# Patient Record
Sex: FEMALE | Race: WHITE | NOT HISPANIC OR LATINO | Employment: FULL TIME | URBAN - METROPOLITAN AREA
[De-identification: names, ages, dates, MRNs, and addresses within clinical notes are randomized per-mention and may not be internally consistent; named-entity substitution may affect disease eponyms.]

---

## 2017-02-27 ENCOUNTER — TRANSCRIBE ORDERS (OUTPATIENT)
Dept: URGENT CARE | Facility: CLINIC | Age: 42
End: 2017-02-27

## 2017-02-27 DIAGNOSIS — M54.16 RADICULOPATHY, LUMBAR REGION: ICD-10-CM

## 2017-02-27 DIAGNOSIS — S33.6XXA SACROILIAC (LIGAMENT) SPRAIN, INITIAL ENCOUNTER: Primary | ICD-10-CM

## 2017-02-28 ENCOUNTER — TREATMENT (OUTPATIENT)
Dept: PHYSICAL THERAPY | Facility: CLINIC | Age: 42
End: 2017-02-28

## 2017-02-28 DIAGNOSIS — M54.50 LEFT-SIDED LOW BACK PAIN WITHOUT SCIATICA, UNSPECIFIED CHRONICITY: Primary | ICD-10-CM

## 2017-02-28 PROCEDURE — 97001 PR PHYS THERAPY EVALUATION: CPT | Performed by: PHYSICAL THERAPIST

## 2017-02-28 PROCEDURE — 97110 THERAPEUTIC EXERCISES: CPT | Performed by: PHYSICAL THERAPIST

## 2017-02-28 PROCEDURE — 97035 APP MDLTY 1+ULTRASOUND EA 15: CPT | Performed by: PHYSICAL THERAPIST

## 2017-02-28 NOTE — PROGRESS NOTES
Physical Therapy Initial Evaluation and Plan of Care        Subjective Evaluation    History of Present Illness  Date of onset: 2017  Mechanism of injury: Squatting at work at [employer].  Job title   Job requirements occasional lifting up to 25#    Presently working restrictive duty    Quality of life: good    Pain  Current pain ratin  At best pain ratin  At worst pain rating: 3  Location: low back  Quality: dull ache  Relieving factors: rest (sitting)  Exacerbated by: prolonged standing.  Progression: improved    Social Support  Lives in: one-story house  Lives with: spouse    Diagnostic Tests  No diagnostic tests performed    Treatments  Previous treatment: chiropractic (5 years ago)  Patient Goals  Patient goal: prevent re-injury     Short term goals:  1 to 2 weeks  Patient will demonstrate functional lumbar range of motion all planes without pain  Patient will report less than 1pain with functional activities  Patient will be independent in HEP  Patient will demonstrate improved core stabilization strength        Long term goals 4  weeks  Patient will demonstrate improved body mechanics with squatting floor to waist lifting up to 25#   Patient will report minimal to no back pain with performing job demand activities             Objective     Palpation   Left   Tenderness of the lumbar paraspinals.     Right Tenderness of the lumbar paraspinals.     Additional Palpation Details  Trigger points in bilateral piriformis    Active Range of Motion     Lumbar   Flexion: Active lumbar flexion: tightness at end range. WFL  Extension: Active lumbar extension: tightness at endrange. WFL  Left lateral flexion: WFL  Right lateral flexion: WFL  Left rotation: Active left lumbar rotation: 75% of normall range with pain in left piriformis.   Right rotation: WFL    Strength/Myotome Testing     Left Hip   Planes of Motion   Flexion: 4    Right Hip   Planes of Motion   Flexion: 4    Left Knee   Extension:  4+    Right Knee   Extension: 4+    Left Ankle/Foot   Dorsiflexion: 5  Plantar flexion: 5    Right Ankle/Foot   Dorsiflexion: 5  Plantar flexion: 5    Additional Strength Details  Core stabilization weakness present    Tightness in left HS, bilateral piriformis and bilateral hip adductors     Tests     Lumbar     Left   Negative passive SLR.     Right   Negative passive SLR.     Left Pelvic Girdle/Sacrum   Negative: sacrum compression.     Right Pelvic Girdle/Sacrum   Negative: sacrum compression.     Additional Tests Details  Prone press up negative    Ambulation     Observational Gait     Additional Observational Gait Details  Prolonged weightshifting thru LLE in stance phase of gait         Assessment & Plan     Assessment  Impairments: abnormal gait, abnormal or restricted ROM, impaired physical strength, lacks appropriate home exercise program and pain with function  Assessment details: 41 year old female with diagnosis of low back pain which occurred at work after squatting.  She presents with left greater than right low back pain, stiffness and core weakness which is limiting her from performing her regular duty job responsibilities at [employer] as a   Barriers to therapy: none  Prognosis: good    Goals  prevent re-injury     Short term goals:  1 to 2 weeks  Patient will demonstrate functional lumbar range of motion all planes without pain  Patient will report less than 1pain with functional activities  Patient will be independent in HEP  Patient will demonstrate improved core stabilization strength        Long term goals 4  weeks  Patient will demonstrate improved body mechanics with squatting floor to waist lifting up to 25#   Patient will report minimal to no back pain with performing job demand activities      Plan  Therapy options: will be seen for skilled physical therapy services  Planned modality interventions: cryotherapy, thermotherapy (hydrocollator packs), high voltage pulsed current  (pain management) and ultrasound  Planned therapy interventions: abdominal trunk stabilization, body mechanics training, flexibility, functional ROM exercises, home exercise program, manual therapy, soft tissue mobilization, spinal/joint mobilization, strengthening, stretching and therapeutic activities  Frequency: 3x week  Duration in weeks: 4  Treatment plan discussed with: patient        Manual Therapy:    0     mins  09384;  Therapeutic Exercise:    20     mins  60845;     Neuromuscular Oralia:    0    mins  85354;    Therapeutic Activity:     0     mins  33848;     Gait Trainin     mins  27416;     Ultrasound:     9     mins  54909;    Work Hardening           0      mins 99434  Iontophoresis               0   mins 27919    Timed Treatment:   29   mins   Total Treatment:     65   mins    PT SIGNATURE: Kami Shannon, PT   DATE TREATMENT INITIATED: 2017    Initial Certification  Certification Period: 2017  I certify that the therapy services are furnished while this patient is under my care.  The services outlined above are required by this patient, and will be reviewed every 90 days.     PHYSICIAN: Roseline Story, LEXX      DATE:     Please sign and return via fax to [phone].. Thank you, [facility].

## 2017-03-07 ENCOUNTER — TREATMENT (OUTPATIENT)
Dept: PHYSICAL THERAPY | Facility: CLINIC | Age: 42
End: 2017-03-07

## 2017-03-07 DIAGNOSIS — M54.50 LEFT-SIDED LOW BACK PAIN WITHOUT SCIATICA, UNSPECIFIED CHRONICITY: Primary | ICD-10-CM

## 2017-03-07 PROCEDURE — 97530 THERAPEUTIC ACTIVITIES: CPT | Performed by: PHYSICAL THERAPIST

## 2017-03-07 PROCEDURE — 97110 THERAPEUTIC EXERCISES: CPT | Performed by: PHYSICAL THERAPIST

## 2017-03-07 NOTE — PROGRESS NOTES
Physical Therapy Progress Note  3/7/2017  Roseline Story, LEXX    Re: Lucy Story  ________________________________________________________________      Ms. Lucy Story, has attended 2/3 PT sessions.  Treatment has consisted of: therapeutic exercises and HEP     S: Ms. Lucy Story states: soreness after first visit of PT and doing exercises      Subjective Evaluation    Pain  Current pain ratin  At best pain ratin  At worst pain ratin  Location: low back           Objective     Palpation     Additional Palpation Details  No palpable tenderness in lumbar spinal structures or muscles    Active Range of Motion     Lumbar   Flexion: WFL  Extension: WFL  Left lateral flexion: WFL  Right lateral flexion: WFL  Left rotation: WFL  Right rotation: WFL    Strength/Myotome Testing     Additional Strength Details  Core stabilizers in good range     See Exercise, Manual, and Modality Logs for complete treatment.       Assessment & Plan     Assessment  Assessment details: Patient's lumbar range of motion is WNL all planes.  She is reporting no low back pain with daily activities.  She demonstrates good core stabilization strength with proper body mechanics with floor to waist lifting to 25# in which she reports is her job requirements. She has a good understanding of her present HEP      Recommend DC to HEP             Manual Therapy:    0     mins  82552;  Therapeutic Exercise:    15     mins  82047;     Neuromuscular Oralia:    0    mins  61407;    Therapeutic Activity:     15     mins  05166;     Gait Trainin     mins  49628;     Ultrasound:     0     mins  34424;    Work Hardening           0      mins 71120  Iontophoresis               0   mins 41424    Timed Treatment:   30   mins   Total Treatment:     32   mins    Kami Shannon PT  Physical Therapist

## 2017-03-31 ENCOUNTER — DOCUMENTATION (OUTPATIENT)
Dept: PHYSICAL THERAPY | Facility: CLINIC | Age: 42
End: 2017-03-31

## 2019-07-15 ENCOUNTER — HOSPITAL ENCOUNTER (OUTPATIENT)
Facility: HOSPITAL | Age: 44
Setting detail: OBSERVATION
Discharge: HOME | End: 2019-07-16
Attending: EMERGENCY MEDICINE | Admitting: SURGERY

## 2019-07-15 ENCOUNTER — APPOINTMENT (OUTPATIENT)
Dept: CT IMAGING | Facility: HOSPITAL | Age: 44
End: 2019-07-15

## 2019-07-15 DIAGNOSIS — K57.92 DIVERTICULITIS: Primary | ICD-10-CM

## 2019-07-15 LAB
ALBUMIN SERPL-MCNC: 4.1 G/DL (ref 3.5–5.2)
ALBUMIN/GLOB SERPL: 1.3 G/DL
ALP SERPL-CCNC: 53 U/L (ref 39–117)
ALT SERPL W P-5'-P-CCNC: 11 U/L (ref 1–33)
ANION GAP SERPL CALCULATED.3IONS-SCNC: 12.9 MMOL/L (ref 5–15)
AST SERPL-CCNC: 12 U/L (ref 1–32)
BACTERIA UR QL AUTO: NORMAL /HPF
BASOPHILS # BLD AUTO: 0.01 10*3/MM3 (ref 0–0.2)
BASOPHILS NFR BLD AUTO: 0.1 % (ref 0–1.5)
BILIRUB SERPL-MCNC: 1.1 MG/DL (ref 0.2–1.2)
BILIRUB UR QL STRIP: NEGATIVE
BUN BLD-MCNC: 7 MG/DL (ref 6–20)
BUN/CREAT SERPL: 10.8 (ref 7–25)
CALCIUM SPEC-SCNC: 9.1 MG/DL (ref 8.6–10.5)
CHLORIDE SERPL-SCNC: 102 MMOL/L (ref 98–107)
CLARITY UR: CLEAR
CO2 SERPL-SCNC: 25.1 MMOL/L (ref 22–29)
COLOR UR: YELLOW
CREAT BLD-MCNC: 0.65 MG/DL (ref 0.57–1)
DEPRECATED RDW RBC AUTO: 40.7 FL (ref 37–54)
EOSINOPHIL # BLD AUTO: 0.08 10*3/MM3 (ref 0–0.4)
EOSINOPHIL NFR BLD AUTO: 0.7 % (ref 0.3–6.2)
ERYTHROCYTE [DISTWIDTH] IN BLOOD BY AUTOMATED COUNT: 12.3 % (ref 12.3–15.4)
GFR SERPL CREATININE-BSD FRML MDRD: 100 ML/MIN/1.73
GLOBULIN UR ELPH-MCNC: 3.2 GM/DL
GLUCOSE BLD-MCNC: 107 MG/DL (ref 65–99)
GLUCOSE UR STRIP-MCNC: NEGATIVE MG/DL
HCG SERPL QL: NEGATIVE
HCT VFR BLD AUTO: 41.9 % (ref 34–46.6)
HGB BLD-MCNC: 13.8 G/DL (ref 12–15.9)
HGB UR QL STRIP.AUTO: ABNORMAL
HYALINE CASTS UR QL AUTO: NORMAL /LPF
IMM GRANULOCYTES # BLD AUTO: 0.04 10*3/MM3 (ref 0–0.05)
IMM GRANULOCYTES NFR BLD AUTO: 0.3 % (ref 0–0.5)
KETONES UR QL STRIP: ABNORMAL
LEUKOCYTE ESTERASE UR QL STRIP.AUTO: NEGATIVE
LYMPHOCYTES # BLD AUTO: 1.39 10*3/MM3 (ref 0.7–3.1)
LYMPHOCYTES NFR BLD AUTO: 11.8 % (ref 19.6–45.3)
MAGNESIUM SERPL-MCNC: 1.8 MG/DL (ref 1.6–2.6)
MCH RBC QN AUTO: 29.6 PG (ref 26.6–33)
MCHC RBC AUTO-ENTMCNC: 32.9 G/DL (ref 31.5–35.7)
MCV RBC AUTO: 89.9 FL (ref 79–97)
MONOCYTES # BLD AUTO: 1 10*3/MM3 (ref 0.1–0.9)
MONOCYTES NFR BLD AUTO: 8.5 % (ref 5–12)
NEUTROPHILS # BLD AUTO: 9.23 10*3/MM3 (ref 1.7–7)
NEUTROPHILS NFR BLD AUTO: 78.6 % (ref 42.7–76)
NITRITE UR QL STRIP: NEGATIVE
NRBC BLD AUTO-RTO: 0 /100 WBC (ref 0–0.2)
PH UR STRIP.AUTO: 5.5 [PH] (ref 5–8)
PLATELET # BLD AUTO: 242 10*3/MM3 (ref 140–450)
PMV BLD AUTO: 10.7 FL (ref 6–12)
POTASSIUM BLD-SCNC: 3.6 MMOL/L (ref 3.5–5.2)
PROT SERPL-MCNC: 7.3 G/DL (ref 6–8.5)
PROT UR QL STRIP: ABNORMAL
RBC # BLD AUTO: 4.66 10*6/MM3 (ref 3.77–5.28)
RBC # UR: NORMAL /HPF
REF LAB TEST METHOD: NORMAL
SODIUM BLD-SCNC: 140 MMOL/L (ref 136–145)
SP GR UR STRIP: 1.02 (ref 1–1.03)
SQUAMOUS #/AREA URNS HPF: NORMAL /HPF
UROBILINOGEN UR QL STRIP: ABNORMAL
WBC NRBC COR # BLD: 11.75 10*3/MM3 (ref 3.4–10.8)
WBC UR QL AUTO: NORMAL /HPF

## 2019-07-15 PROCEDURE — G0378 HOSPITAL OBSERVATION PER HR: HCPCS

## 2019-07-15 PROCEDURE — 25010000002 PIPERACILLIN SOD-TAZOBACTAM PER 1 G: Performed by: SURGERY

## 2019-07-15 PROCEDURE — 80053 COMPREHEN METABOLIC PANEL: CPT | Performed by: EMERGENCY MEDICINE

## 2019-07-15 PROCEDURE — 25010000002 IOPAMIDOL 61 % SOLUTION: Performed by: EMERGENCY MEDICINE

## 2019-07-15 PROCEDURE — 99219 PR INITIAL OBSERVATION CARE/DAY 50 MINUTES: CPT | Performed by: SURGERY

## 2019-07-15 PROCEDURE — 99284 EMERGENCY DEPT VISIT MOD MDM: CPT

## 2019-07-15 PROCEDURE — 96365 THER/PROPH/DIAG IV INF INIT: CPT

## 2019-07-15 PROCEDURE — 25010000002 PIPERACILLIN SOD-TAZOBACTAM PER 1 G: Performed by: EMERGENCY MEDICINE

## 2019-07-15 PROCEDURE — 25010000002 ONDANSETRON PER 1 MG: Performed by: EMERGENCY MEDICINE

## 2019-07-15 PROCEDURE — 81001 URINALYSIS AUTO W/SCOPE: CPT | Performed by: EMERGENCY MEDICINE

## 2019-07-15 PROCEDURE — 83735 ASSAY OF MAGNESIUM: CPT | Performed by: EMERGENCY MEDICINE

## 2019-07-15 PROCEDURE — 85025 COMPLETE CBC W/AUTO DIFF WBC: CPT | Performed by: EMERGENCY MEDICINE

## 2019-07-15 PROCEDURE — 84703 CHORIONIC GONADOTROPIN ASSAY: CPT | Performed by: EMERGENCY MEDICINE

## 2019-07-15 PROCEDURE — 74177 CT ABD & PELVIS W/CONTRAST: CPT

## 2019-07-15 PROCEDURE — 96375 TX/PRO/DX INJ NEW DRUG ADDON: CPT

## 2019-07-15 RX ORDER — PROMETHAZINE HYDROCHLORIDE 12.5 MG/1
12.5 TABLET ORAL EVERY 6 HOURS PRN
Status: DISCONTINUED | OUTPATIENT
Start: 2019-07-15 | End: 2019-07-16 | Stop reason: HOSPADM

## 2019-07-15 RX ORDER — PROMETHAZINE HYDROCHLORIDE 25 MG/1
12.5 SUPPOSITORY RECTAL EVERY 6 HOURS PRN
Status: DISCONTINUED | OUTPATIENT
Start: 2019-07-15 | End: 2019-07-16 | Stop reason: HOSPADM

## 2019-07-15 RX ORDER — NALOXONE HCL 0.4 MG/ML
0.4 VIAL (ML) INJECTION
Status: DISCONTINUED | OUTPATIENT
Start: 2019-07-15 | End: 2019-07-16 | Stop reason: HOSPADM

## 2019-07-15 RX ORDER — PROMETHAZINE HYDROCHLORIDE 25 MG/ML
12.5 INJECTION, SOLUTION INTRAMUSCULAR; INTRAVENOUS EVERY 6 HOURS PRN
Status: DISCONTINUED | OUTPATIENT
Start: 2019-07-15 | End: 2019-07-16 | Stop reason: HOSPADM

## 2019-07-15 RX ORDER — SODIUM CHLORIDE 0.9 % (FLUSH) 0.9 %
3-10 SYRINGE (ML) INJECTION AS NEEDED
Status: DISCONTINUED | OUTPATIENT
Start: 2019-07-15 | End: 2019-07-16 | Stop reason: HOSPADM

## 2019-07-15 RX ORDER — HYDROMORPHONE HYDROCHLORIDE 1 MG/ML
0.5 INJECTION, SOLUTION INTRAMUSCULAR; INTRAVENOUS; SUBCUTANEOUS
Status: DISCONTINUED | OUTPATIENT
Start: 2019-07-15 | End: 2019-07-16 | Stop reason: HOSPADM

## 2019-07-15 RX ORDER — SODIUM CHLORIDE 0.9 % (FLUSH) 0.9 %
3 SYRINGE (ML) INJECTION EVERY 12 HOURS SCHEDULED
Status: DISCONTINUED | OUTPATIENT
Start: 2019-07-15 | End: 2019-07-16 | Stop reason: HOSPADM

## 2019-07-15 RX ORDER — HYDROCODONE BITARTRATE AND ACETAMINOPHEN 5; 325 MG/1; MG/1
1 TABLET ORAL EVERY 4 HOURS PRN
Status: DISCONTINUED | OUTPATIENT
Start: 2019-07-15 | End: 2019-07-16 | Stop reason: HOSPADM

## 2019-07-15 RX ORDER — KETOROLAC TROMETHAMINE 30 MG/ML
30 INJECTION, SOLUTION INTRAMUSCULAR; INTRAVENOUS EVERY 6 HOURS PRN
Status: DISCONTINUED | OUTPATIENT
Start: 2019-07-15 | End: 2019-07-16 | Stop reason: HOSPADM

## 2019-07-15 RX ORDER — ONDANSETRON 2 MG/ML
4 INJECTION INTRAMUSCULAR; INTRAVENOUS ONCE
Status: COMPLETED | OUTPATIENT
Start: 2019-07-15 | End: 2019-07-15

## 2019-07-15 RX ORDER — ACETAMINOPHEN 325 MG/1
650 TABLET ORAL EVERY 4 HOURS PRN
Status: DISCONTINUED | OUTPATIENT
Start: 2019-07-15 | End: 2019-07-16 | Stop reason: HOSPADM

## 2019-07-15 RX ORDER — KETOROLAC TROMETHAMINE 15 MG/ML
15 INJECTION, SOLUTION INTRAMUSCULAR; INTRAVENOUS ONCE
Status: DISCONTINUED | OUTPATIENT
Start: 2019-07-15 | End: 2019-07-16 | Stop reason: HOSPADM

## 2019-07-15 RX ADMIN — TAZOBACTAM SODIUM AND PIPERACILLIN SODIUM 3.38 G: 375; 3 INJECTION, SOLUTION INTRAVENOUS at 10:17

## 2019-07-15 RX ADMIN — ACETAMINOPHEN 650 MG: 325 TABLET, FILM COATED ORAL at 20:23

## 2019-07-15 RX ADMIN — TAZOBACTAM SODIUM AND PIPERACILLIN SODIUM 4.5 G: 500; 4 INJECTION, SOLUTION INTRAVENOUS at 19:31

## 2019-07-15 RX ADMIN — SODIUM CHLORIDE 1000 ML: 9 INJECTION, SOLUTION INTRAVENOUS at 08:20

## 2019-07-15 RX ADMIN — ONDANSETRON HYDROCHLORIDE 4 MG: 2 SOLUTION INTRAMUSCULAR; INTRAVENOUS at 08:20

## 2019-07-15 RX ADMIN — IOPAMIDOL 85 ML: 612 INJECTION, SOLUTION INTRAVENOUS at 09:22

## 2019-07-15 NOTE — PLAN OF CARE
Problem: Patient Care Overview  Goal: Plan of Care Review  Outcome: Ongoing (interventions implemented as appropriate)   07/15/19 6312   Coping/Psychosocial   Plan of Care Reviewed With patient   Plan of Care Review   Progress improving   OTHER   Outcome Summary admit from e.r. vss, minimal pain to llq , ocas. mild nausea. jayden sips of cl liqs     Goal: Individualization and Mutuality  Outcome: Ongoing (interventions implemented as appropriate)    Goal: Discharge Needs Assessment  Outcome: Ongoing (interventions implemented as appropriate)    Goal: Interprofessional Rounds/Family Conf  Outcome: Ongoing (interventions implemented as appropriate)      Problem: Bowel Disease, Inflammatory (Adult)  Goal: Signs and Symptoms of Listed Potential Problems Will be Absent, Minimized or Managed (Bowel Disease, Inflammatory)  Outcome: Ongoing (interventions implemented as appropriate)

## 2019-07-15 NOTE — ED NOTES
Patient reports intermittent left flank pain with nausea, decreased appetite and fever 100-102 onset Saturday. Patient denies urinary complaints, vomiting or diarrhea. NAD noted, skin p/w/d.      Alba Jones RN  07/15/19 3273

## 2019-07-15 NOTE — ED NOTES
Attempted to call report to 6P, on hold for eight minutes, will call back     Alba Jones RN  07/15/19 1381

## 2019-07-15 NOTE — ED PROVIDER NOTES
EMERGENCY DEPARTMENT ENCOUNTER    CHIEF COMPLAINT  Chief Complaint: Flank pain  History given by: Patient  History limited by: None  Room Number: P698/1  PMD: Иван Bass MD      HPI:  Pt is a 43 y.o. female who presents complaining of 2 days of left-sided abdominal discomfort.  Patient states that the pain started on Saturday as a left sided abdominal discomfort that does not radiate.  Symptoms are constant, but the intensity of the pain does fluctuate.  Seems to be worse with sitting up and improves with laying down.  Discomfort is described as a pressure with occasional sharper pains.  Has had some associated lower abdominal cramping and nausea, as well as some intermittent fevers with T-max around 102 at home.  She has not taken anything for these symptoms.  She has not had any significant decreased appetite or p.o. intake.  She denies any associated vomiting, diarrhea, states that she has had normal bowel movements, denies dysuria, frequency, or hematuria.      PAST MEDICAL HISTORY  Active Ambulatory Problems     Diagnosis Date Noted   • No Active Ambulatory Problems     Resolved Ambulatory Problems     Diagnosis Date Noted   • No Resolved Ambulatory Problems     Past Medical History:   Diagnosis Date   • Anxiety    • Headache    • Injury of back    Reports a history of tubal pregnancy 5 years ago.  Is currently on Mirena.    PAST SURGICAL HISTORY  History reviewed. No pertinent surgical history.    FAMILY HISTORY  History reviewed. No pertinent family history.    SOCIAL HISTORY  Social History     Socioeconomic History   • Marital status:      Spouse name: Not on file   • Number of children: Not on file   • Years of education: Not on file   • Highest education level: Not on file   Tobacco Use   • Smoking status: Current Every Day Smoker     Packs/day: 0.50     Years: 4.00     Pack years: 2.00     Start date: 5/28/2013   • Smokeless tobacco: Never Used   Substance and Sexual Activity   • Alcohol  use: No   • Drug use: No   • Sexual activity: Defer       ALLERGIES  Patient has no known allergies.    REVIEW OF SYSTEMS  Review of Systems   All other review systems negative unless otherwise stated above    PHYSICAL EXAM  ED Triage Vitals [07/15/19 0802]   Temp Heart Rate Resp BP SpO2   98.4 °F (36.9 °C) (!) 121 18 -- 100 %      Temp src Heart Rate Source Patient Position BP Location FiO2 (%)   Tympanic -- -- -- --       Physical Exam   Constitutional: She is oriented to person, place, and time and well-developed, well-nourished, and in no distress. No distress.   HENT:   Head: Normocephalic and atraumatic.   Mouth/Throat: Mucous membranes are normal.   Eyes: EOM are normal.   Neck: Normal range of motion.   Cardiovascular: Normal rate and regular rhythm. Exam reveals no gallop and no friction rub.   No murmur heard.  Pulmonary/Chest: Effort normal. No respiratory distress. She has no wheezes. She has no rhonchi. She has no rales.   Abdominal: Soft. Bowel sounds are normal. She exhibits no distension. There is tenderness (mild LUQ tenderness ). There is no rebound and no guarding.   Musculoskeletal: Normal range of motion. She exhibits no deformity.   Neurological: She is alert and oriented to person, place, and time. GCS score is 15.   moving all extremities, no focal deficits   Skin: Skin is warm and dry. She is not diaphoretic.   Psychiatric:   Calm, cooperative       LAB RESULTS  Lab Results (last 24 hours)     Procedure Component Value Units Date/Time    Urinalysis With Microscopic If Indicated (No Culture) - Urine, Clean Catch [[id number]]  (Abnormal) Collected:  07/15/19 0816    Specimen:  Urine, Clean Catch Updated:  07/15/19 0834     Color, UA Yellow     Appearance, UA Clear     pH, UA 5.5     Specific Gravity, UA 1.021     Glucose, UA Negative     Ketones, UA Trace     Bilirubin, UA Negative     Blood, UA Small (1+)     Protein, UA Trace     Leuk Esterase, UA Negative     Nitrite, UA Negative      Urobilinogen, UA 1.0 E.U./dL    Urinalysis, Microscopic Only - Urine, Clean Catch [[id number]] Collected:  07/15/19 0816    Specimen:  Urine, Clean Catch Updated:  07/15/19 0834     RBC, UA 0-2 /HPF      WBC, UA 0-2 /HPF      Bacteria, UA None Seen /HPF      Squamous Epithelial Cells, UA 0-2 /HPF      Hyaline Casts, UA 0-2 /LPF      Methodology Automated Microscopy    CBC & Differential [[id number]] Collected:  07/15/19 0820    Specimen:  Blood Updated:  07/15/19 0838    Narrative:       The following orders were created for panel order CBC & Differential.  Procedure                               Abnormality         Status                     ---------                               -----------         ------                     CBC Auto Differential[[id number]]        Abnormal            Final result                 Please view results for these tests on the individual orders.    CBC Auto Differential [[id number]]  (Abnormal) Collected:  07/15/19 0820    Specimen:  Blood Updated:  07/15/19 0838     WBC 11.75 10*3/mm3      RBC 4.66 10*6/mm3      Hemoglobin 13.8 g/dL      Hematocrit 41.9 %      MCV 89.9 fL      MCH 29.6 pg      MCHC 32.9 g/dL      RDW 12.3 %      RDW-SD 40.7 fl      MPV 10.7 fL      Platelets 242 10*3/mm3      Neutrophil % 78.6 %      Lymphocyte % 11.8 %      Monocyte % 8.5 %      Eosinophil % 0.7 %      Basophil % 0.1 %      Immature Grans % 0.3 %      Neutrophils, Absolute 9.23 10*3/mm3      Lymphocytes, Absolute 1.39 10*3/mm3      Monocytes, Absolute 1.00 10*3/mm3      Eosinophils, Absolute 0.08 10*3/mm3      Basophils, Absolute 0.01 10*3/mm3      Immature Grans, Absolute 0.04 10*3/mm3      nRBC 0.0 /100 WBC     hCG, Serum, Qualitative [[id number]]  (Normal) Collected:  07/15/19 0820    Specimen:  Blood Updated:  07/15/19 0858     HCG Qualitative Negative    Comprehensive Metabolic Panel [[id number]]  (Abnormal) Collected:  07/15/19 0820    Specimen:  Blood Updated:  07/15/19 0855     Glucose 107 mg/dL       BUN 7 mg/dL      Creatinine 0.65 mg/dL      Sodium 140 mmol/L      Potassium 3.6 mmol/L      Chloride 102 mmol/L      CO2 25.1 mmol/L      Calcium 9.1 mg/dL      Total Protein 7.3 g/dL      Albumin 4.10 g/dL      ALT (SGPT) 11 U/L      AST (SGOT) 12 U/L      Alkaline Phosphatase 53 U/L      Total Bilirubin 1.1 mg/dL      eGFR Non African Amer 100 mL/min/1.73      Globulin 3.2 gm/dL      A/G Ratio 1.3 g/dL      BUN/Creatinine Ratio 10.8     Anion Gap 12.9 mmol/L     Narrative:       GFR Normal >60  Chronic Kidney Disease <60  Kidney Failure <15    Magnesium [[id number]]  (Normal) Collected:  07/15/19 0820    Specimen:  Blood Updated:  07/15/19 0855     Magnesium 1.8 mg/dL           I ordered the above labs and reviewed the results    RADIOLOGY  CT Abdomen Pelvis With Contrast   Final Result   There is extensive acute diverticulitis at the descending   colon with a contained microperforation. There are several bubbles of   free air surrounded by inflammatory change and a trace amount of fluid,   but there is no fluid collection or evidence for an abscess.       Discussed with Dr. Mclean.       This report was finalized on 7/15/2019 3:47 PM by Dr. Mabel Velazco M.D.               I ordered the above noted radiological studies. Interpreted by radiologist. Discussed with radiologist (Dr. Velazco). Reviewed by me in PACS.       PROCEDURES  Procedures      PROGRESS AND CONSULTS  ED Course as of Jul 15 1646   Mon Jul 15, 2019   0958 Updated patient on CT and laboratory findings, notified her that we are awaiting surgical consult with recommendation for likely hospitalization and IV antibiotics.  [DC]   1005  recommends IV Zosyn, and will hospitalize the patient to his service overnight.  [DC]      ED Course User Index  [DC] Eliazar Mclean MD           MEDICAL DECISION MAKING  Results were reviewed/discussed with the patient and they were also made aware of online access. Pt also made aware that some labs, such as  cultures, will not be resulted during ER visit and follow up with PMD is necessary.     MDM  Number of Diagnoses or Management Options  Diverticulitis:   Diagnosis management comments: Patient's history and exam today consistent with possible diverticulitis, labs show mild leukocytosis in conjunction with recent measured fevers up to 102 at home, though afebrile today.  Patient with extensive diverticulitis in the descending colon on the left with evidence of several microperforations without abscess.  Due to these findings and symptoms, I have discussed the case with  who will hospitalize patient today.  She has been given a dose of IV Zosyn, and is updated and agreeable to the plan for hospital stay.  She has remained hemodynamically stable in the emergency department.       Amount and/or Complexity of Data Reviewed  Clinical lab tests: ordered and reviewed  Tests in the radiology section of CPT®: ordered and reviewed  Discussion of test results with the performing providers: yes (Dr. Velazco)  Discuss the patient with other providers: yes (Dr. Vilchis)  Independent visualization of images, tracings, or specimens: yes (Inflammatory process descending colon)           DIAGNOSIS  Final diagnoses:   Diverticulitis       DISPOSITION  Admit    Latest Documented Vital Signs:  As of 4:46 PM  BP- 106/74 HR- 61 Temp- 98.6 °F (37 °C) (Oral) O2 sat- 100%           Eliazar Mclean MD  07/15/19 9339

## 2019-07-15 NOTE — H&P
Admit H&P    Admiting Physician: Romeo Vilchis MD    Reason for Admission: Diverticulitis with microperforation    Chief Complaint   Patient presents with   • Flank Pain       HPI:   The patient is a very pleasant 43 y.o. years old female that presented to the hospital with abdominal discomfort, fatigue on nausea.  The patient reports feeling tired with abdominal discomfort and bloating that started last Friday.  The discomfort became abdominal pain on Saturday.  Her pain improved on Sunday but she continues to feel bloating sensation.  She reports nausea but no vomiting.  She has been passing gas and having bowel movements.  She had one episode of fever this morning and decided to come to emergency room.  Reports no episodes of abdominal pain like this in the past.  She has history of constipation and usually has 1 bowel movement every 2 days.  Once a month she usually becomes more constipated and has no bowel movement for 5 days.  She takes orange juice that helps.  She drinks large amount of sodas and does not eat a high-fiber diet.  She has been tolerating diet without any problems      Past Medical History:   Diagnosis Date   • Anxiety    • Headache    • Injury of back        History reviewed. No pertinent surgical history.      Current Facility-Administered Medications:   •  acetaminophen (TYLENOL) tablet 650 mg, 650 mg, Oral, Q4H PRN, Romeo Vilchis MD  •  enoxaparin (LOVENOX) syringe 40 mg, 40 mg, Subcutaneous, Q24H, Romeo Vilchis MD  •  HYDROcodone-acetaminophen (NORCO) 5-325 MG per tablet 1 tablet, 1 tablet, Oral, Q4H PRN, Romeo Vilchis MD  •  HYDROmorphone (DILAUDID) injection 0.5 mg, 0.5 mg, Intravenous, Q2H PRN **AND** naloxone (NARCAN) injection 0.4 mg, 0.4 mg, Intravenous, Q5 Min PRN, Romeo Vilchis MD  •  ketorolac (TORADOL) injection 15 mg, 15 mg, Intravenous, Once, Eliazar Mclean MD  •  ketorolac (TORADOL) injection 30 mg, 30 mg, Intravenous, Q6H  PRN, Romeo Vilchis MD  •  piperacillin-tazobactam (ZOSYN) 4.5 g in iso-osmotic dextrose 100 mL IVPB (premix), 4.5 g, Intravenous, Q8H, Romeo Vilchis MD  •  promethazine (PHENERGAN) tablet 12.5 mg, 12.5 mg, Oral, Q6H PRN **OR** promethazine (PHENERGAN) injection 12.5 mg, 12.5 mg, Intramuscular, Q6H PRN **OR** promethazine (PHENERGAN) injection 12.5 mg, 12.5 mg, Intravenous, Q6H PRN **OR** promethazine (PHENERGAN) suppository 12.5 mg, 12.5 mg, Rectal, Q6H PRN, Romeo Vilchis MD  •  sodium chloride 0.9 % flush 3 mL, 3 mL, Intravenous, Q12H, Romeo Vilchis MD  •  sodium chloride 0.9 % flush 3-10 mL, 3-10 mL, Intravenous, PRN, Romeo Vilchis MD    No Known Allergies    Social History     Socioeconomic History   • Marital status:      Spouse name: Not on file   • Number of children: Not on file   • Years of education: Not on file   • Highest education level: Not on file   Tobacco Use   • Smoking status: Current Every Day Smoker     Packs/day: 0.50     Years: 4.00     Pack years: 2.00     Start date: 5/28/2013   • Smokeless tobacco: Never Used   Substance and Sexual Activity   • Alcohol use: No   • Drug use: No   • Sexual activity: Defer       History reviewed. No pertinent family history.    ROS:   Constitutional: denies any weight changes, fatigue or weakness.  Eyes: : denies blurred or double vision  Cardiovascular: denies chest pain, palpitations, edemas.  Respiratory: denies cough, sputum, SOB.  Gastrointestinal: reports abdominal bloating and constipation  Genitourinary: denies dysuria, frequency.  Endocrine: denies cold intolerance, lethargy and flushing.  Hem: denies excessive bruising and postop bleeding.  Musculoskeletal: denies weakness, joint swelling, pain or stiffness.  Neuro: denies seizures, CVA, paresthesia, or peripheral neuropathy.   Skin: denies change in nevi, rashes, masses.    Physical Exam:   Vitals:    07/15/19 1213   BP: 106/74    Pulse: 61   Resp: 16   Temp: 98.6 °F (37 °C)   SpO2: 100%     GENERAL:alert, well appearing, and in no distress and oriented to person, place, and time  HEENT: normochephalic, atraumatic, no scleral icterus moist mucous membranes.  NECK: Supple there is no thyromegaly or lymphadenopathy  CHEST: clear to auscultation, no wheezes, rales or rhonchi, symmetric air entry  CARDIAC: regular rate and rhythm    ABDOMEN: soft, mild tenderness noticed over the left flank, nondistended, no masses or organomegaly  No rebound or guarding and no peritoneal signs  EXTREMITIES: no cyanosis, clubbing or edema    NEURO: alert and oriented, normal speech, cranial nerves 2-12 grossly intact, no focal deficits   SKIN: Moist, warm, no rashes.    Diagnostic workup:   CT scan of the abdomen and pelvis was reviewed by me and agreed with the radiology report that there is descending colon acute diverticulitis with microperforation without abscess formation  IUD in place    White blood cell 11.7  Hemoglobin 13.8  CMP within normal limits    Assessment and plan:   The patient is a very pleasant 43 y.o. years old female with acute diverticulitis with microperforation.  She has minimal pain and is nonseptic.  Patient has been tolerating diet without any problem.  I discussed with the patient about further step.  I recommend that she is admitted to the hospital for treatment with IV antibiotics and clear liquid diet today.  We will advance the diet slowly tomorrow and switch her to p.o. antibiotics if she responds well.  She understands that it is unlikely she will need to have surgery for this but there is a slight chance that this may worsen.  She verbalized understanding and agree with the plan    -Admit to the hospital   -IV antibiotics  -Liquid diet  -SCDs, Lovenox  -Repeat labs on a.m.    Case was discussed with patient.    Risk and benefits of the current recommended treatment were explained to the patient that had time to ask questions  that where answered, verbalized understanding and agreed with the plan.     Romeo Vilchis MD  General, Minimally Invasive and Endoscopic Surgery  [facility]    [address], [address]  [place], [place], [address]  P: [phone]  F: [phone]

## 2019-07-15 NOTE — ED NOTES
"Pt complains of left flank \"pressure\" with nausea and low grade fever since Saturday.      Preethi Mariscal, RN  07/15/19 0801    "

## 2019-07-16 VITALS
HEART RATE: 54 BPM | WEIGHT: 270.7 LBS | HEIGHT: 70 IN | SYSTOLIC BLOOD PRESSURE: 118 MMHG | RESPIRATION RATE: 16 BRPM | TEMPERATURE: 98.1 F | BODY MASS INDEX: 38.75 KG/M2 | DIASTOLIC BLOOD PRESSURE: 73 MMHG | OXYGEN SATURATION: 99 %

## 2019-07-16 LAB
ANION GAP SERPL CALCULATED.3IONS-SCNC: 9.9 MMOL/L (ref 5–15)
BASOPHILS # BLD AUTO: 0.01 10*3/MM3 (ref 0–0.2)
BASOPHILS NFR BLD AUTO: 0.1 % (ref 0–1.5)
BUN BLD-MCNC: 5 MG/DL (ref 6–20)
BUN/CREAT SERPL: 8.8 (ref 7–25)
CALCIUM SPEC-SCNC: 9.1 MG/DL (ref 8.6–10.5)
CHLORIDE SERPL-SCNC: 100 MMOL/L (ref 98–107)
CO2 SERPL-SCNC: 25.1 MMOL/L (ref 22–29)
CREAT BLD-MCNC: 0.57 MG/DL (ref 0.57–1)
DEPRECATED RDW RBC AUTO: 40.2 FL (ref 37–54)
EOSINOPHIL # BLD AUTO: 0.26 10*3/MM3 (ref 0–0.4)
EOSINOPHIL NFR BLD AUTO: 3.3 % (ref 0.3–6.2)
ERYTHROCYTE [DISTWIDTH] IN BLOOD BY AUTOMATED COUNT: 12.2 % (ref 12.3–15.4)
GFR SERPL CREATININE-BSD FRML MDRD: 117 ML/MIN/1.73
GLUCOSE BLD-MCNC: 102 MG/DL (ref 65–99)
HCT VFR BLD AUTO: 37.7 % (ref 34–46.6)
HGB BLD-MCNC: 12.5 G/DL (ref 12–15.9)
IMM GRANULOCYTES # BLD AUTO: 0.03 10*3/MM3 (ref 0–0.05)
IMM GRANULOCYTES NFR BLD AUTO: 0.4 % (ref 0–0.5)
LYMPHOCYTES # BLD AUTO: 1.52 10*3/MM3 (ref 0.7–3.1)
LYMPHOCYTES NFR BLD AUTO: 19.5 % (ref 19.6–45.3)
MCH RBC QN AUTO: 29.8 PG (ref 26.6–33)
MCHC RBC AUTO-ENTMCNC: 33.2 G/DL (ref 31.5–35.7)
MCV RBC AUTO: 90 FL (ref 79–97)
MONOCYTES # BLD AUTO: 0.69 10*3/MM3 (ref 0.1–0.9)
MONOCYTES NFR BLD AUTO: 8.8 % (ref 5–12)
NEUTROPHILS # BLD AUTO: 5.29 10*3/MM3 (ref 1.7–7)
NEUTROPHILS NFR BLD AUTO: 67.9 % (ref 42.7–76)
NRBC BLD AUTO-RTO: 0 /100 WBC (ref 0–0.2)
PLATELET # BLD AUTO: 240 10*3/MM3 (ref 140–450)
PMV BLD AUTO: 10.9 FL (ref 6–12)
POTASSIUM BLD-SCNC: 3.6 MMOL/L (ref 3.5–5.2)
RBC # BLD AUTO: 4.19 10*6/MM3 (ref 3.77–5.28)
SODIUM BLD-SCNC: 135 MMOL/L (ref 136–145)
WBC NRBC COR # BLD: 7.8 10*3/MM3 (ref 3.4–10.8)

## 2019-07-16 PROCEDURE — 25010000002 PIPERACILLIN SOD-TAZOBACTAM PER 1 G: Performed by: SURGERY

## 2019-07-16 PROCEDURE — 99217 PR OBSERVATION CARE DISCHARGE MANAGEMENT: CPT | Performed by: SURGERY

## 2019-07-16 PROCEDURE — G0378 HOSPITAL OBSERVATION PER HR: HCPCS

## 2019-07-16 PROCEDURE — 85025 COMPLETE CBC W/AUTO DIFF WBC: CPT | Performed by: SURGERY

## 2019-07-16 PROCEDURE — 80048 BASIC METABOLIC PNL TOTAL CA: CPT | Performed by: SURGERY

## 2019-07-16 PROCEDURE — 96366 THER/PROPH/DIAG IV INF ADDON: CPT

## 2019-07-16 RX ORDER — HYDROCODONE BITARTRATE AND ACETAMINOPHEN 5; 325 MG/1; MG/1
1 TABLET ORAL EVERY 4 HOURS PRN
Qty: 20 TABLET | Refills: 0 | Status: SHIPPED | OUTPATIENT
Start: 2019-07-16 | End: 2019-07-25

## 2019-07-16 RX ORDER — PROMETHAZINE HYDROCHLORIDE 12.5 MG/1
12.5 TABLET ORAL EVERY 6 HOURS PRN
Qty: 10 TABLET | Refills: 0 | Status: SHIPPED | OUTPATIENT
Start: 2019-07-16 | End: 2019-08-21

## 2019-07-16 RX ORDER — AMOXICILLIN AND CLAVULANATE POTASSIUM 875; 125 MG/1; MG/1
1 TABLET, FILM COATED ORAL EVERY 12 HOURS SCHEDULED
Qty: 19 TABLET | Refills: 0 | Status: SHIPPED | OUTPATIENT
Start: 2019-07-16 | End: 2019-07-26

## 2019-07-16 RX ORDER — AMOXICILLIN AND CLAVULANATE POTASSIUM 875; 125 MG/1; MG/1
1 TABLET, FILM COATED ORAL EVERY 12 HOURS SCHEDULED
Status: DISCONTINUED | OUTPATIENT
Start: 2019-07-16 | End: 2019-07-16 | Stop reason: HOSPADM

## 2019-07-16 RX ADMIN — AMOXICILLIN AND CLAVULANATE POTASSIUM 1 TABLET: 875; 125 TABLET, FILM COATED ORAL at 13:41

## 2019-07-16 RX ADMIN — SODIUM CHLORIDE, PRESERVATIVE FREE 3 ML: 5 INJECTION INTRAVENOUS at 08:54

## 2019-07-16 RX ADMIN — TAZOBACTAM SODIUM AND PIPERACILLIN SODIUM 4.5 G: 500; 4 INJECTION, SOLUTION INTRAVENOUS at 10:16

## 2019-07-16 RX ADMIN — TAZOBACTAM SODIUM AND PIPERACILLIN SODIUM 4.5 G: 500; 4 INJECTION, SOLUTION INTRAVENOUS at 02:34

## 2019-07-16 NOTE — PROGRESS NOTES
"Progress Note    Chief Complaint   Patient presents with   • Flank Pain       S: There were no events overnight.  Patient is doing fine.  Reports minimal abdominal pain    O:BP 92/61 (BP Location: Right arm, Patient Position: Lying)   Pulse 50   Temp 98.3 °F (36.8 °C) (Oral)   Resp 16   Ht 177.8 cm (70\")   Wt 123 kg (270 lb 11.2 oz)   SpO2 98%   BMI 38.84 kg/m²   Body mass index is 38.84 kg/m².    I/O last 3 completed shifts:  In: 1790 [P.O.:740; IV Piggyback:1050]  Out: 3100 [Urine:3100]  I/O this shift:  In: 760 [P.O.:660; IV Piggyback:100]  Out: 1100 [Urine:1100]      GENERAL:alert, well appearing, and in no distress and oriented to person, place, and time  HEENT: normochephalic, atraumatic, moist mucus membranes, clear sclerae   CHEST: clear to auscultation, no wheezes, rales or rhonchi, symmetric air entry  CARDIAC: regular rate and rhythm    ABDOMEN: soft,  nondistended, no masses or organomegaly  Minimal tenderness noted over the left flank, no rebound or guarding and no peritoneal signs  EXTREMITIES: no cyanosis, clubbing or edema  SKIN: Warm and moist, no rashes    Results from last 7 days   Lab Units 07/16/19  0531   WBC 10*3/mm3 7.80   HEMOGLOBIN g/dL 12.5   HEMATOCRIT % 37.7   PLATELETS 10*3/mm3 240     Results from last 7 days   Lab Units 07/16/19  0531 07/15/19  0820   SODIUM mmol/L 135* 140   POTASSIUM mmol/L 3.6 3.6   CHLORIDE mmol/L 100 102   CO2 mmol/L 25.1 25.1   BUN mg/dL 5* 7   CREATININE mg/dL 0.57 0.65   CALCIUM mg/dL 9.1 9.1   BILIRUBIN mg/dL  --  1.1   ALK PHOS U/L  --  53   ALT (SGPT) U/L  --  11   AST (SGOT) U/L  --  12   GLUCOSE mg/dL 102* 107*       ROS:   Constitutional: denies any weight changes, fatigue or weakness.  Cardiovascular: denies chest pain, palpitations, edemas.  Respiratory: denies cough, sputum, SOB.  Gastrointestinal: denies N&V, abd pain, diarrhea, constipation.  Genitourinary: denies dysuria, frequency.    DIET: Clears    A/P: 43 y.o. female with descending " colon diverticulitis with microperforation.  She has minimal pain and has been responding very well to antibiotics.  There is no evidence of worsening disease.  Discussed with the patient about further step.  Recommend that she get switched to p.o. antibiotics and she will complete a full course at home.  I will advance her diet and see how she does.  If everything goes fine then she may be her to go home either tonight or tomorrow    -Switch to p.o. antibiotics, DC IV fluids and advance diet    Romeo Vilchis MD  General, Minimally Invasive and Endoscopic Surgery  [facility]    [address], [address]  [place], [place], [address]  P: [phone]  F: [phone]

## 2019-07-16 NOTE — PROGRESS NOTES
"Discharge Planning Assessment  [facility]     Patient Name: Lucy Story  MRN: [id number]  Today's Date: 7/16/2019    Admit Date: 7/15/2019    Discharge Needs Assessment     Row Name 07/16/19 1432       Living Environment    Lives With  child(eric), adult;spouse    Name(s) of Who Lives With Patient  --  Wu    Current Living Arrangements  home/apartment/condo    Primary Care Provided by  self    Provides Primary Care For  no one    Family Caregiver if Needed  none    Quality of Family Relationships  supportive    Able to Return to Prior Arrangements  yes       Resource/Environmental Concerns    Resource/Environmental Concerns  none    Transportation Concerns  car, none       Transition Planning    Patient/Family Anticipates Transition to  home with family    Patient/Family Anticipated Services at Transition  none    Transportation Anticipated  family or friend will provide       Discharge Needs Assessment    Readmission Within the Last 30 Days  no previous admission in last 30 days    Concerns to be Addressed  no discharge needs identified;denies needs/concerns at this time;adjustment to diagnosis/illness    Equipment Currently Used at Home  none    Anticipated Changes Related to Illness  none    Equipment Needed After Discharge  none        Discharge Plan     Row Name 07/16/19 9606       Plan    Plan  Home w/o needs    Plan Comments  Pt confirmed the address, PCP and pharmacy are correct. Pt said she lives with her  Wu ([phone]), she said their 19 yr old son also lives with them. pt said she is AIDL, uses no DME, can afford her Rx, has never had home health, does not have a Medical POA / Living Will and plans to return home \"hopefully today\" at discharge and does not anticipate any needs.      Julee Luque RN     Demographic Summary     Row Name 07/16/19 1431       General Information    Admission Type  observation    Arrived From  home    Referral Source  admission list    Reason " for Consult  discharge planning    Preferred Language  English     Used During This Interaction  no       Contact Information    Permission Granted to Share Info With  family/designee        Functional Status     Row Name 07/16/19 1434       Functional Status, IADL    Medications  independent    Meal Preparation  independent    Housekeeping  independent    Laundry  independent    Shopping  independent     Patient Forms     Row Name 07/16/19 1433       Patient Forms    Provider Choice List  Attempted Pt declined list, she said she does not anticipate any needs            Julee Luque RN

## 2019-07-16 NOTE — PLAN OF CARE
Problem: Patient Care Overview  Goal: Plan of Care Review  Outcome: Ongoing (interventions implemented as appropriate)   07/16/19 7640   Coping/Psychosocial   Plan of Care Reviewed With patient   Plan of Care Review   Progress improving   OTHER   Outcome Summary Afebrile. PO abx started. Diet advanced to GI soft at lunch and was tolerated. Declines need for pain meds. Wants to go home.

## 2019-07-16 NOTE — DISCHARGE INSTRUCTIONS
-High-fiber diet  - A high fiber diet with plenty of fluids (up to 8 glasses of water daily) is suggested to relieve these symptoms.  Metamucil, 1 tablespoon once or twice daily can be used to keep bowels regular if needed.  -Return to the hospital having severe worsening abdominal pain, nausea, vomiting, fever or chills  -Patient to return to work on Thursday  -Follow-up in my office in 1 month

## 2019-07-16 NOTE — PLAN OF CARE
Problem: Patient Care Overview  Goal: Plan of Care Review  Outcome: Ongoing (interventions implemented as appropriate)   07/16/19 0402   Coping/Psychosocial   Plan of Care Reviewed With patient   Plan of Care Review   Progress improving   OTHER   Outcome Summary vss, tylenol given early last night and has remained afebrile, minimal c/o left abd pain, ad cricket, kvo w/abx, clears, resting well     Goal: Individualization and Mutuality  Outcome: Ongoing (interventions implemented as appropriate)    Goal: Discharge Needs Assessment  Outcome: Ongoing (interventions implemented as appropriate)    Goal: Interprofessional Rounds/Family Conf  Outcome: Ongoing (interventions implemented as appropriate)      Problem: Bowel Disease, Inflammatory (Adult)  Goal: Signs and Symptoms of Listed Potential Problems Will be Absent, Minimized or Managed (Bowel Disease, Inflammatory)  Outcome: Ongoing (interventions implemented as appropriate)

## 2019-07-16 NOTE — DISCHARGE SUMMARY
Admission date: 7/15/2019   Discharge date: 7/16/19     Admission diagnosis: Diverticulitis [K57.92]     Discharge diagnosis: Acute diverticulitis with microperforation     Procedure: None     Meds:      Your medication list      START taking these medications      Instructions Last Dose Given Next Dose Due   amoxicillin-clavulanate 875-125 MG per tablet  Commonly known as:  AUGMENTIN      Take 1 tablet by mouth Every 12 (Twelve) Hours for 19 doses.       HYDROcodone-acetaminophen 5-325 MG per tablet  Commonly known as:  NORCO      Take 1 tablet by mouth Every 4 (Four) Hours As Needed for Moderate Pain  for up to 9 days.       promethazine 12.5 MG tablet  Commonly known as:  PHENERGAN      Take 1 tablet by mouth Every 6 (Six) Hours As Needed for Nausea or Vomiting.             Where to Get Your Medications      These medications were sent to [facility] [place], [place] - [address] [place] [address] - [phone]  - [phone] [address] [address]    Phone:  [phone]   · amoxicillin-clavulanate 875-125 MG per tablet  · promethazine 12.5 MG tablet     You can get these medications from any pharmacy    Bring a paper prescription for each of these medications  · HYDROcodone-acetaminophen 5-325 MG per tablet         Instructions:    - High-fiber diet  - A high fiber diet with plenty of fluids (up to 8 glasses of water daily) is suggested to relieve these symptoms.  Metamucil, 1 tablespoon once or twice daily can be used to keep bowels regular if needed.  -Return to the hospital having severe worsening abdominal pain, nausea, vomiting, fever or chills  -Patient to return to work on Thursday  -Follow-up in my office in 1 month     Romeo Vilchis MD  General, Minimally Invasive and Endoscopic Surgery  [facility]

## 2019-07-17 NOTE — PROGRESS NOTES
Discharge Planning Assessment  [facility]     Patient Name: Lucy Story  MRN: [id number]  Today's Date: 7/17/2019    Admit Date: 7/15/2019      Discharge Plan     Row Name 07/17/19 1120       Plan    Plan Comments  Discharged. No discharge needs identified.   Julee Luque RN     Expected Discharge Date and Time     Expected Discharge Date Expected Discharge Time    Jul 16, 2019      Julee Luque, RN

## 2019-08-21 ENCOUNTER — OFFICE VISIT (OUTPATIENT)
Dept: SURGERY | Facility: CLINIC | Age: 44
End: 2019-08-21

## 2019-08-21 VITALS — OXYGEN SATURATION: 99 % | HEIGHT: 70 IN | BODY MASS INDEX: 38.54 KG/M2 | WEIGHT: 269.2 LBS | HEART RATE: 59 BPM

## 2019-08-21 DIAGNOSIS — R11.0 NAUSEA: ICD-10-CM

## 2019-08-21 DIAGNOSIS — K57.92 DIVERTICULITIS: Primary | ICD-10-CM

## 2019-08-21 PROCEDURE — 99213 OFFICE O/P EST LOW 20 MIN: CPT | Performed by: SURGERY

## 2019-08-21 RX ORDER — OMEPRAZOLE 20 MG/1
20 CAPSULE, DELAYED RELEASE ORAL DAILY
Qty: 30 CAPSULE | Refills: 1 | Status: SHIPPED | OUTPATIENT
Start: 2019-08-21 | End: 2020-08-20

## 2019-08-21 RX ORDER — POLYETHYLENE GLYCOL 3350 17 G/17G
17 POWDER, FOR SOLUTION ORAL DAILY
Qty: 60 EACH | Refills: 2 | Status: SHIPPED | OUTPATIENT
Start: 2019-08-21 | End: 2023-03-30 | Stop reason: ALTCHOICE

## 2019-08-21 NOTE — PROGRESS NOTES
"CC: Follow-up complicated diverticulitis and nausea     HPI: The patient is a very pleasant 44-year-old female with recent admission to the hospital with complicated diverticulitis.  The patient was discharged from the hospital after she was treated with IV antibiotics and bowel rest.  The patient recovered really well and is here today for follow-up.  She reports abdominal discomfort over the left side of the abdomen that happen intermittently.  She has been having mild constipation.  She has been taking stool softeners as well as fiber supplements.  She has bowel movements every 2 to 3 days.  She denies any weight loss, fevers or chills.  She has never had a colonoscopy.  She has also been having nausea on a daily basis but no vomiting.  This has been happening with or without any food intake.  She denies any heartburn, regurgitation, dysphagia     PMH: Complicated diverticulitis, anxiety     PSH: Tubal pregnancy excision    MEDS: Phenergan as needed     ALL: No known drug allergies    FH and SH: Family history is noncontributory to current issue.  The patient has gone through menopause and is not on hormonal replacement therapy.     ROS:   Constitutional: denies any weight changes, reports fatigue   HEENT: Denies hearing loss and rhinorrhea  Cardiovascular: denies chest pain, palpitations, edemas.  Respiratory: denies cough, sputum, SOB.  Gastrointestinal: Reports abdominal pain: Constipation and nausea  Genitourinary: denies dysuria, frequency.  Endocrine: denies cold intolerance, lethargy and flushing.  Hem: denies excessive bruising and postop bleeding.  Musculoskeletal: denies weakness, joint swelling, pain or stiffness.  Neuro: denies seizures, CVA, paresthesia, or peripheral neuropathy.   Skin: denies change in nevi, rashes, masses.  Psych: Reports decreased concentration and anxiety     PE:   Vitals:    08/21/19 1021   Pulse: 59   SpO2: 99%   Weight: 122 kg (269 lb 3.2 oz)   Height: 177.8 cm (70\")   BMI " 38.6  Alert and oriented ×3, no acute distress.  Head is normocephalic and atraumatic.  Neck is supple there is no thyromegaly or lymphadenopathy  Chest is clear bilaterally there is no added sounds  Regular rate and rhythm, no murmurs  Abdomen is soft and mild discomfort over the left lower abdomen, there is no rebound or guarding and there is no peritoneal signs.  No clubbing cyanosis or edema in lower or upper extremities    Diagnostic studies:   CT scan of the abdomen and pelvis 7/15/19: There is extensive acute diverticulitis of the descending colon with contained microperforation, without abscess formation     Assessment and plan    The patient is a very pleasant 44-year-old female with complicated diverticulitis successfully treated with antibiotics and bowel rest.  She continues to have mild abdominal discomfort but I do not think there is any evidence of progression or worsening of her disease.  She has been having vomiting that seems to be related to gastritis.  She also has constipation that has not been well treated.  Discussed with the patient about further step.  I recommend that she undergoes colonoscopy for follow-up after diverticulitis and upper endoscopy for evaluation of persistent nausea risk and benefits of the procedure including bleeding, infections, perforation were discussed in detail with the patient that verbalized understanding and agree with the plan.     -Plan for upper endoscopy and colonoscopy  -Patient will need to have bowel preparation and clear liquid diet the day before the procedure  -Start bowel regimen with Senokot, MiraLAX and fiber.  Increase water intake  -Start omeprazole 20 mg daily      Romeo Vilchis MD  General, Minimally Invasive and Endoscopic Surgery  [facility] [place]     [address], [address]  [place], [place], [address]  P: [phone]  F: [phone]

## 2019-08-22 PROBLEM — R11.0 NAUSEA: Status: ACTIVE | Noted: 2019-08-22

## 2023-03-30 ENCOUNTER — OFFICE VISIT (OUTPATIENT)
Dept: INFECTIOUS DISEASES | Facility: CLINIC | Age: 48
End: 2023-03-30
Payer: OTHER MISCELLANEOUS

## 2023-03-30 VITALS
DIASTOLIC BLOOD PRESSURE: 61 MMHG | HEART RATE: 52 BPM | BODY MASS INDEX: 26 KG/M2 | SYSTOLIC BLOOD PRESSURE: 98 MMHG | WEIGHT: 181.2 LBS | TEMPERATURE: 97.3 F | RESPIRATION RATE: 20 BRPM
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
## 2023-03-30 RX ORDER — EMTRICITABINE AND TENOFOVIR DISOPROXIL FUMARATE 200; 300 MG/1; MG/1
1 TABLET, FILM COATED ORAL DAILY
COMMUNITY
Start: 2023-02-28 | End: 2023-03-30

## 2023-03-30 RX ORDER — RALTEGRAVIR 400 MG/1
1 TABLET, FILM COATED ORAL EVERY 12 HOURS SCHEDULED
COMMUNITY
Start: 2023-03-01 | End: 2023-03-30

## 2023-03-30 NOTE — PROGRESS NOTES
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  States that she came into contact with a needle that was in the trash and it ashley blood on her right thumb.  States she was seen that day in employee [facility] who started her on Isentress and Truvada for 30-day course which she is finishing today.  She had screening for HIV, hepatitis C and hepatitis B which were all negative.    Patient reports she had some side effects associated with these medications.  States she had some dry skin and felt like her skin was falling off.  States she also had lost a decent amount of hair.  Today is her last dose and otherwise she feels well.  Denies any fevers or chills.     Objective   Vital Signs:  BP 98/61   Pulse 52   Temp 97.3 °F (36.3 °C)   Resp 20   Wt 82.2 kg (181 lb 3.2 oz)   BMI 26.00 kg/m²   Estimated body mass index is 26 kg/m² as calculated from the following:    Height as of 8/21/19: 177.8 cm (70\").    Weight as of this encounter: 82.2 kg (181 lb 3.2 oz).             Physical Exam  Constitutional:       General: She is not in acute distress.     Appearance: Normal appearance. She is normal weight. She is not ill-appearing.   HENT:      Head: Normocephalic and atraumatic.      Nose: Nose normal. No rhinorrhea.      Mouth/Throat:      Mouth: Mucous membranes are moist.      Pharynx: No oropharyngeal exudate.   Eyes:      General: No scleral icterus.     Extraocular Movements: Extraocular movements intact.      Pupils: Pupils are equal, round, and reactive to light.   Cardiovascular:      Rate and Rhythm: Normal rate and regular rhythm.      Pulses: Normal pulses.      Heart sounds: Normal heart sounds.   Pulmonary:      Effort: Pulmonary effort is normal. No respiratory " distress.   Abdominal:      General: Abdomen is flat.      Palpations: Abdomen is soft.   Musculoskeletal:         General: No swelling or tenderness. Normal range of motion.      Cervical back: Normal range of motion and neck supple.      Right lower leg: No edema.      Left lower leg: No edema.   Skin:     General: Skin is warm and dry.      Findings: No rash.   Neurological:      General: No focal deficit present.      Mental Status: She is alert and oriented to person, place, and time.   Psychiatric:         Mood and Affect: Mood normal.         Behavior: Behavior normal.        Result Review :  The following data was reviewed by: Azeem Real DO on 03/30/2023:  Common labs    Common Labs 2/27/23 2/27/23 3/13/23 3/13/23 3/27/23 3/27/23    1946 1948 1409 1409 1500 1529   Glucose  81  83  82   BUN  14  14  14   Creatinine  0.65  0.69  0.88   Sodium  138  144  142   Potassium  3.6  4.4  4.3   Chloride  101  106  106   Calcium  9.3  9.7  9.1   Albumin  4.4  4.7  4.3   Total Bilirubin  0.2  0.3  0.2   Alkaline Phosphatase  63  52  63   AST (SGOT)  30  25  25   ALT (SGPT)  20  19  20   WBC 7.22  5.62  6.25    Hemoglobin 12.5  13.1  13.3    Hematocrit 37.7  38.9  39.6    Platelets 327  274  269            Data reviewed: Reviewed hepatitis C, hepatitis B, HIV and basic labs from employee health             Assessment and Plan   Diagnoses and all orders for this visit:    1. Accident caused by hypodermic needle, initial encounter (Primary)  -     Hepatitis C Antibody; Standing  -     HIV-1 / O / 2 Ag / Antibody 4th Generation; Standing  -     Hepatitis B Virus (HBV) Screening and Diagnosis; Standing    Screening has been negative to date and she has completed her postexposure prophylaxis for HIV.  She received 2 vaccinations for hepatitis B at employee health.  Plan to follow-up with repeat HIV, hepatitis C and hepatitis B testing at the 3-month brian and 6-month brian.  She was counseled on these diseases.  If  these remain negative then would not recommend any further treatment.       I spent 47 minutes caring for Lucy on this date of service. This time includes time spent by me in the following activities:preparing for the visit, reviewing tests, obtaining and/or reviewing a separately obtained history, performing a medically appropriate examination and/or evaluation , counseling and educating the patient/family/caregiver, ordering medications, tests, or procedures, documenting information in the medical record, independently interpreting results and communicating that information with the patient/family/caregiver and care coordination  Follow Up   No follow-ups on file.  Patient was given instructions and counseling regarding her condition or for health maintenance advice. Please see specific information pulled into the AVS if appropriate.

## 2023-03-31 ENCOUNTER — PATIENT ROUNDING (BHMG ONLY) (OUTPATIENT)
Dept: INFECTIOUS DISEASES | Facility: CLINIC | Age: 48
End: 2023-03-31
Payer: COMMERCIAL

## 2023-03-31 NOTE — PROGRESS NOTES
March 31, 2023    Done in office                We're always looking for ways to make our patients' experiences even better. Do you have recommendations on ways we may improve?  no    Overall were you satisfied with your first visit to our practice? yes

## 2024-11-14 ENCOUNTER — APPOINTMENT (RX ONLY)
Dept: URBAN - METROPOLITAN AREA CLINIC 142 | Facility: CLINIC | Age: 49
Setting detail: DERMATOLOGY
End: 2024-11-14

## 2024-11-14 DIAGNOSIS — L73.8 OTHER SPECIFIED FOLLICULAR DISORDERS: ICD-10-CM

## 2024-11-14 DIAGNOSIS — L72.8 OTHER FOLLICULAR CYSTS OF THE SKIN AND SUBCUTANEOUS TISSUE: ICD-10-CM

## 2024-11-14 DIAGNOSIS — M71 OTHER BURSOPATHIES: ICD-10-CM

## 2024-11-14 DIAGNOSIS — L60.3 NAIL DYSTROPHY: ICD-10-CM

## 2024-11-14 DIAGNOSIS — L72.0 EPIDERMAL CYST: ICD-10-CM

## 2024-11-14 DIAGNOSIS — L82.1 OTHER SEBORRHEIC KERATOSIS: ICD-10-CM

## 2024-11-14 PROBLEM — M71.341 OTHER BURSAL CYST, RIGHT HAND: Status: ACTIVE | Noted: 2024-11-14

## 2024-11-14 PROCEDURE — ? COUNSELING

## 2024-11-14 PROCEDURE — ? TREATMENT REGIMEN

## 2024-11-14 PROCEDURE — ? EXTRACTIONS

## 2024-11-14 PROCEDURE — 99203 OFFICE O/P NEW LOW 30 MIN: CPT

## 2024-11-14 PROCEDURE — ? DEFER

## 2024-11-14 ASSESSMENT — LOCATION DETAILED DESCRIPTION DERM
LOCATION DETAILED: LEFT MEDIAL FOREHEAD
LOCATION DETAILED: LEFT GREAT TOENAIL
LOCATION DETAILED: RIGHT CENTRAL EYEBROW
LOCATION DETAILED: LEFT SUPERIOR MEDIAL UPPER BACK
LOCATION DETAILED: RIGHT INFERIOR LATERAL UPPER BACK
LOCATION DETAILED: RIGHT DISTAL RADIAL DORSAL INDEX FINGER

## 2024-11-14 ASSESSMENT — LOCATION SIMPLE DESCRIPTION DERM
LOCATION SIMPLE: LEFT FOREHEAD
LOCATION SIMPLE: LEFT UPPER BACK
LOCATION SIMPLE: LEFT GREAT TOE
LOCATION SIMPLE: RIGHT EYEBROW
LOCATION SIMPLE: RIGHT UPPER BACK
LOCATION SIMPLE: RIGHT INDEX FINGER

## 2024-11-14 ASSESSMENT — LOCATION ZONE DERM
LOCATION ZONE: FINGER
LOCATION ZONE: FACE
LOCATION ZONE: TRUNK
LOCATION ZONE: TOENAIL

## 2024-11-14 ASSESSMENT — PAIN INTENSITY VAS: HOW INTENSE IS YOUR PAIN 0 BEING NO PAIN, 10 BEING THE MOST SEVERE PAIN POSSIBLE?: NO PAIN

## 2024-11-14 NOTE — PROCEDURE: EXTRACTIONS
Render Number Of Lesions Treated: no
Detail Level: Detailed
Acne Type: Comedonal Lesions
Prep Text (Optional): Prior to removal the treatment areas were prepped in the usual fashion.
Extraction Method: 11 blade and q-tip
Post-Care Instructions: I reviewed with the patient in detail post-care instructions. Patient is to keep the treatment areas dry overnight, and then apply bacitracin twice daily until healed. Patient may apply hydrogen peroxide soaks to remove any crusting.
Consent was obtained and risks were reviewed including but not limited to scarring, infection, bleeding, scabbing, incomplete removal, and allergy to anesthesia.

## 2024-11-14 NOTE — PROCEDURE: DEFER
Procedure To Be Performed At Next Visit: Excision
X Size Of Lesion In Cm (Optional): 0
Size Of Lesion In Cm (Optional): 0.8
Detail Level: Detailed
Introduction Text (Please End With A Colon): The following procedure was deferred:
no

## 2024-12-10 ENCOUNTER — APPOINTMENT (RX ONLY)
Dept: URBAN - METROPOLITAN AREA CLINIC 23 | Facility: CLINIC | Age: 49
Setting detail: DERMATOLOGY
End: 2024-12-10

## 2024-12-10 DIAGNOSIS — M67.4 GANGLION: ICD-10-CM

## 2024-12-10 PROBLEM — M67.441 GANGLION, RIGHT HAND: Status: ACTIVE | Noted: 2024-12-10

## 2024-12-10 PROCEDURE — 26160 REMOVE TENDON SHEATH LESION: CPT | Mod: F6

## 2024-12-10 PROCEDURE — ? PRESCRIPTION

## 2024-12-10 PROCEDURE — ? EXCISION, BENIGN, HAND CYST

## 2024-12-10 PROCEDURE — 14040 TIS TRNFR F/C/C/M/N/A/G/H/F: CPT

## 2024-12-10 RX ORDER — CEPHALEXIN 500 MG/1
1 CAPSULE ORAL TID
Qty: 15 | Refills: 0 | Status: ERX | COMMUNITY
Start: 2024-12-10

## 2024-12-10 RX ORDER — HYDROCODONE BITARTRATE AND ACETAMINOPHEN 5; 325 MG/1; MG/1
1 TABLET ORAL
Qty: 12 | Refills: 0 | Status: ERX | COMMUNITY
Start: 2024-12-10

## 2024-12-10 RX ADMIN — CEPHALEXIN 1: 500 CAPSULE ORAL at 00:00

## 2024-12-10 RX ADMIN — HYDROCODONE BITARTRATE AND ACETAMINOPHEN 1: 5; 325 TABLET ORAL at 00:00

## 2024-12-10 ASSESSMENT — LOCATION ZONE DERM: LOCATION ZONE: FINGER

## 2024-12-10 ASSESSMENT — LOCATION SIMPLE DESCRIPTION DERM: LOCATION SIMPLE: RIGHT INDEX FINGER

## 2024-12-10 ASSESSMENT — LOCATION DETAILED DESCRIPTION DERM: LOCATION DETAILED: RIGHT DISTAL RADIAL DORSAL INDEX FINGER

## 2024-12-10 NOTE — PROCEDURE: EXCISION, BENIGN, HAND CYST
Post-Care Instructions: I reviewed post-care instructions with the patient in detail. The patient is not to engage in any heavy lifting, exercise, or swimming for the next 2-3 weeks. The patient can take dressings off in 2 days and initiate wound care per instructions and shower but is not to soak or submerge the affected hand (no washing dishes, swim, etc.). Should the patient develop any fevers, chills, bleeding, severe pain patient will contact the office immediately.
X Size Of Lesion In Cm (Optional): 1
Consent: Informed consent was obtained from the patient. The risks, benefits, expectations and alternatives to therapy were discussed in detail. Specifically, the risks of infection, scarring, bleeding, prolonged wound healing, incomplete removal, allergy to anesthesia, nerve or vessel injury and recurrence and the need for further surgery were addressed. Prior to the procedure, the treatment site was clearly identified and confirmed by the patient. All components of Universal Protocol/PAUSE Rule completed.
Epidermal Closure: horizontal mattress
Excision Method: Longitudinal
Epidermal Sutures: 4-0 Ethilon
Anesthesia Volume In Cc: 3
Suture Removal: 14 days
Size Of Margin In Cm: 0
Scalpel Size: 15 blade
Path Notes (To The Pathologist): Not tagged.
Disposition: There were no complications.
Detail Level: Detailed
Dressing: Xeroform
Layered Repair: Flap
Wound Care: Bacitracin
Flap Type: Advancement Flap (Single)
Estimated Blood Loss (Cc): minimal
Anesthesia Type: 1% lidocaine with epinephrine and a 1:10 solution of 8.4% sodium bicarbonate
Lab: [id number]
Medical Necessity Clause: This procedure was medically necessary because the lesion that was treated was:
Hemostasis: Electrocautery

## 2024-12-23 ENCOUNTER — APPOINTMENT (OUTPATIENT)
Dept: URBAN - METROPOLITAN AREA CLINIC 11 | Facility: CLINIC | Age: 49
Setting detail: DERMATOLOGY
End: 2024-12-23

## 2024-12-23 DIAGNOSIS — Z48.89 ENCOUNTER FOR OTHER SPECIFIED SURGICAL AFTERCARE: ICD-10-CM

## 2024-12-23 PROCEDURE — ? COUNSELING - POST-OP CHECK

## 2024-12-23 PROCEDURE — 99024 POSTOP FOLLOW-UP VISIT: CPT

## 2024-12-23 PROCEDURE — ? SUTURE REMOVAL

## 2024-12-23 ASSESSMENT — LOCATION ZONE DERM: LOCATION ZONE: FINGER

## 2024-12-23 ASSESSMENT — LOCATION SIMPLE DESCRIPTION DERM: LOCATION SIMPLE: RIGHT INDEX FINGER

## 2024-12-23 ASSESSMENT — LOCATION DETAILED DESCRIPTION DERM: LOCATION DETAILED: RIGHT DISTAL RADIAL DORSAL INDEX FINGER
